# Patient Record
(demographics unavailable — no encounter records)

---

## 2024-11-20 NOTE — DISCUSSION/SUMMARY
[FreeTextEntry1] :  8 yo M w/ URI.  Recommend supportive care including antipyretics, fluids, humidifier, steamy shower, and nasal saline followed by nasal suction. Can trial zyrtec/ flonase as recommended.  Monitor UO, ensure hydration.  RED FLAGS REVIEWED- discussed s/s of distress/ dehydration, discussed indications for going to ED for eval.  Parent expressed understanding and was able to verbalize back instructions/advice.  Parent to call/ return to office with patient for any concerns/ worsening symptoms.

## 2024-11-20 NOTE — HISTORY OF PRESENT ILLNESS
[EENT/Resp Symptoms] : EENT/RESPIRATORY SYMPTOMS [___ Day(s)] : [unfilled] day(s) [Fatigued] : fatigued [Sick Contacts: ___] : sick contacts: [unfilled] [Fever] : fever [Headache] : headache [Cough] : cough [Decreased Appetite] : decreased appetite [Posttussive emesis] : posttussive emesis [Max Temp: ____] : Max temperature: [unfilled] [Known Exposure to COVID-19] : no known exposure to COVID-19 [Hx of recent COVID-19 infection] : no history of recent COVID-19 infection [Ear Pain] : no ear pain [Rhinorrhea] : no rhinorrhea [Sore Throat] : no sore throat [Diarrhea] : no diarrhea [Decreased Urine Output] : no decreased urine output [FreeTextEntry1] : + cough x6 days, feve 3 days go